# Patient Record
Sex: MALE | URBAN - METROPOLITAN AREA
[De-identification: names, ages, dates, MRNs, and addresses within clinical notes are randomized per-mention and may not be internally consistent; named-entity substitution may affect disease eponyms.]

---
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
## 2025-02-10 ENCOUNTER — APPOINTMENT (OUTPATIENT)
Dept: SURGICAL ONCOLOGY | Facility: MEDICAL CENTER | Age: 20
End: 2025-02-10
Payer: COMMERCIAL

## 2025-03-10 ENCOUNTER — APPOINTMENT (OUTPATIENT)
Facility: MEDICAL CENTER | Age: 20
End: 2025-03-10
Payer: COMMERCIAL

## 2025-03-10 VITALS
TEMPERATURE: 97.6 F | DIASTOLIC BLOOD PRESSURE: 60 MMHG | OXYGEN SATURATION: 97 % | SYSTOLIC BLOOD PRESSURE: 118 MMHG | WEIGHT: 193.78 LBS | HEART RATE: 85 BPM | HEIGHT: 68 IN | BODY MASS INDEX: 29.37 KG/M2

## 2025-03-10 DIAGNOSIS — L02.91 ABSCESS: ICD-10-CM

## 2025-03-10 PROCEDURE — 3074F SYST BP LT 130 MM HG: CPT | Performed by: SURGERY

## 2025-03-10 PROCEDURE — 99213 OFFICE O/P EST LOW 20 MIN: CPT | Performed by: SURGERY

## 2025-03-10 PROCEDURE — 3078F DIAST BP <80 MM HG: CPT | Performed by: SURGERY

## 2025-03-10 ASSESSMENT — FIBROSIS 4 INDEX: FIB4 SCORE: 0.37

## 2025-03-10 NOTE — PROGRESS NOTES
Subjective:   3/10/2025  1:42 PM  Primary care physician:GILMAR Rome        Chief Complaint:   Chief Complaint   Patient presents with    Follow-Up     perianal abscess, pt states symptoms returned multiple times       12/16: History of presenting illness:  Beck Jha  is a pleasant 19 y.o. year old male who presented with a recent perianal abscess which was found on CT in the ER and drained at beside.  He since has noted resolution of a lot of the pain and discomfort but did not a recurrent swelling and drainage about 1-2 weeks after the original procedure.  Since then though, he has not had any further output or drainage and reports no lasting symptoms.  He is having bowel movements and passing stools without difficulty at this time.  He denies any prior anorectal surgeries and does not take any blood thinners or steroids at this time.      3/10:  Pt returns for follow up, recently had a new abscess on the left buttock, he was seen in the ED and had a drainage procedure and this resolved.  This was not in the same location as previous abscesses, and he has no residual symptoms at this time.  He had wanted to have the area looked at though in case it was related to the perianal issue from a couple of months ago.          Past Medical History:   Diagnosis Date    ASTHMA     Eczema     Family disruption by separation or divorce 9/1/2011    Food allergy 9/1/2011     Past Surgical History:   Procedure Laterality Date    APPENDECTOMY LAPAROSCOPIC  11/06/12    APPENDECTOMY LAPAROSCOPIC  11/6/2012    Performed by Nelson Houston M.D. at SURGERY [facility]     No Known Allergies  Current Outpatient Medications   Medication Sig    metformin ER modified (GLUMETZA) 1000 MG TABLET SR 24 HR Take 1 Tablet by mouth every day. (Patient taking differently: Take 0.5 Tablets by mouth every day.)    Blood Glucose Monitoring Suppl (FREESTYLE FREEDOM LITE) w/Device Kit Please provide glucose monitoring kit  per insurance.    glucose blood strip use to test blood sugar 3 times a day    Lancets 30G Misc use to test blood sugar 3 times a day     Social History     Socioeconomic History    Marital status: Single     Spouse name: Not on file    Number of children: Not on file    Years of education: Not on file    Highest education level: Not on file   Occupational History    Not on file   Tobacco Use    Smoking status: Never    Smokeless tobacco: Never   Substance and Sexual Activity    Alcohol use: Never    Drug use: Never    Sexual activity: Never   Other Topics Concern    Behavioral problems Not Asked    Interpersonal relationships Not Asked    Sad or not enjoying activities Not Asked    Suicidal thoughts Not Asked    Poor school performance Not Asked    Reading difficulties Not Asked    Speech difficulties Not Asked    Writing difficulties Not Asked    Inadequate sleep Not Asked    Excessive TV viewing Not Asked    Excessive video game use Not Asked    Inadequate exercise Not Asked    Sports related Not Asked    Poor diet Not Asked    Family concerns for drug/alcohol abuse Not Asked    Poor oral hygiene Not Asked    Bike safety Not Asked    Family concerns vehicle safety Not Asked   Social History Narrative    Not on file     Social Drivers of Health     Financial Resource Strain: Not on file   Food Insecurity: Not on file   Transportation Needs: Not on file   Physical Activity: Not on file   Stress: Not on file   Social Connections: Not on file   Intimate Partner Violence: Not on file   Housing Stability: Not on file      Family History   Problem Relation Age of Onset    Hypertension Father     Diabetes Maternal Grandmother     Diabetes Maternal Grandfather     Heart Disease Paternal Grandmother     Hypertension Paternal Grandmother     Heart Attack Paternal Grandfather     Heart Disease Paternal Grandfather     Hypertension Paternal Grandfather        ROS: Negative except as detailed in HPI.     Objective:   /60  "(BP Location: Left arm, Patient Position: Sitting, BP Cuff Size: Adult)   Pulse 85   Temp 36.4 °C (97.6 °F) (Temporal)   Ht 1.727 m (5' 8\")   Wt 87.9 kg (193 lb 12.6 oz)   SpO2 97%   BMI 29.46 kg/m²     Physical Exam:  Constitutional: Well-developed and well-nourished. Not diaphoretic. No distress.   Skin: Skin is warm and dry. No rash noted.  Head: Atraumatic without lesions.  Eyes: Conjunctivae and extraocular motions are normal. No scleral icterus.   Nose: Nares patent. Septum midline. No discharge.   Neck: Supple, trachea midline. Normal range of motion. No thyromegaly present. No lymphadenopathy--cervical or supraclavicular.  Cardiovascular: Regular rate and rhythm, 2+ pulses   Lungs: Effort normal. No chest wall masses.   Abdomen: Soft, non tender, and without distention. No rebound, guarding, masses.  Rectal: No masses or lesions on external exam, no evidence of residual abscess or infection noted/    Extremities: Warm and well perfused, no pitting edema  Musculoskeletal: All major joints AROM full in all directions without pain.  Neurological: Alert and oriented x 3.   Psychiatric:  Behavior, mood, and affect are appropriate.          Diagnosis:     Assessment & Plan  Abscess               Medical Decision Making:  Today's Assessment / Status / Plan:     19y M with resolved perianal and buttock abscess.  Follow up again prn if symptoms recur or return, in the meantime, recommend a high fiber diet and activity as tolerated.        "

## 2025-05-15 ENCOUNTER — OFFICE VISIT (OUTPATIENT)
Dept: URGENT CARE | Facility: PHYSICIAN GROUP | Age: 20
End: 2025-05-15
Payer: COMMERCIAL

## 2025-05-15 ENCOUNTER — APPOINTMENT (OUTPATIENT)
Dept: RADIOLOGY | Facility: MEDICAL CENTER | Age: 20
End: 2025-05-15
Payer: COMMERCIAL

## 2025-05-15 ENCOUNTER — APPOINTMENT (OUTPATIENT)
Dept: RADIOLOGY | Facility: MEDICAL CENTER | Age: 20
End: 2025-05-15
Attending: EMERGENCY MEDICINE
Payer: COMMERCIAL

## 2025-05-15 ENCOUNTER — HOSPITAL ENCOUNTER (EMERGENCY)
Facility: MEDICAL CENTER | Age: 20
End: 2025-05-16
Attending: EMERGENCY MEDICINE
Payer: COMMERCIAL

## 2025-05-15 VITALS
SYSTOLIC BLOOD PRESSURE: 126 MMHG | HEART RATE: 87 BPM | DIASTOLIC BLOOD PRESSURE: 78 MMHG | BODY MASS INDEX: 28.79 KG/M2 | TEMPERATURE: 99 F | RESPIRATION RATE: 16 BRPM | WEIGHT: 190 LBS | OXYGEN SATURATION: 98 % | HEIGHT: 68 IN

## 2025-05-15 DIAGNOSIS — L02.11 ABSCESS OF NECK: Primary | ICD-10-CM

## 2025-05-15 DIAGNOSIS — K61.0 PERIANAL ABSCESS: ICD-10-CM

## 2025-05-15 DIAGNOSIS — L02.11 ABSCESS OF NECK: ICD-10-CM

## 2025-05-15 DIAGNOSIS — K61.0 PERIANAL ABSCESS: Primary | ICD-10-CM

## 2025-05-15 LAB
ALBUMIN SERPL BCP-MCNC: 4 G/DL (ref 3.2–4.9)
ALBUMIN/GLOB SERPL: 1.1 G/DL
ALP SERPL-CCNC: 151 U/L (ref 30–99)
ALT SERPL-CCNC: 43 U/L (ref 2–50)
ANION GAP SERPL CALC-SCNC: 11 MMOL/L (ref 7–16)
AST SERPL-CCNC: 35 U/L (ref 12–45)
BASOPHILS # BLD AUTO: 0.4 % (ref 0–1.8)
BASOPHILS # BLD: 0.05 K/UL (ref 0–0.12)
BILIRUB SERPL-MCNC: 1.4 MG/DL (ref 0.1–1.5)
BUN SERPL-MCNC: 9 MG/DL (ref 8–22)
CALCIUM ALBUM COR SERPL-MCNC: 9.8 MG/DL (ref 8.5–10.5)
CALCIUM SERPL-MCNC: 9.8 MG/DL (ref 8.5–10.5)
CHLORIDE SERPL-SCNC: 97 MMOL/L (ref 96–112)
CO2 SERPL-SCNC: 22 MMOL/L (ref 20–33)
CREAT SERPL-MCNC: 0.82 MG/DL (ref 0.5–1.4)
EOSINOPHIL # BLD AUTO: 0.04 K/UL (ref 0–0.51)
EOSINOPHIL NFR BLD: 0.3 % (ref 0–6.9)
ERYTHROCYTE [DISTWIDTH] IN BLOOD BY AUTOMATED COUNT: 37.7 FL (ref 35.9–50)
GFR SERPLBLD CREATININE-BSD FMLA CKD-EPI: 129 ML/MIN/1.73 M 2
GLOBULIN SER CALC-MCNC: 3.6 G/DL (ref 1.9–3.5)
GLUCOSE SERPL-MCNC: 326 MG/DL (ref 65–99)
HCT VFR BLD AUTO: 49.1 % (ref 42–52)
HGB BLD-MCNC: 17 G/DL (ref 14–18)
IMM GRANULOCYTES # BLD AUTO: 0.06 K/UL (ref 0–0.11)
IMM GRANULOCYTES NFR BLD AUTO: 0.5 % (ref 0–0.9)
LACTATE SERPL-SCNC: 1.3 MMOL/L (ref 0.5–2)
LYMPHOCYTES # BLD AUTO: 1.81 K/UL (ref 1–4.8)
LYMPHOCYTES NFR BLD: 14.3 % (ref 22–41)
MCH RBC QN AUTO: 29.1 PG (ref 27–33)
MCHC RBC AUTO-ENTMCNC: 34.6 G/DL (ref 32.3–36.5)
MCV RBC AUTO: 83.9 FL (ref 81.4–97.8)
MONOCYTES # BLD AUTO: 1.06 K/UL (ref 0–0.85)
MONOCYTES NFR BLD AUTO: 8.4 % (ref 0–13.4)
NEUTROPHILS # BLD AUTO: 9.67 K/UL (ref 1.82–7.42)
NEUTROPHILS NFR BLD: 76.1 % (ref 44–72)
NRBC # BLD AUTO: 0 K/UL
NRBC BLD-RTO: 0 /100 WBC (ref 0–0.2)
PLATELET # BLD AUTO: 330 K/UL (ref 164–446)
PMV BLD AUTO: 9.1 FL (ref 9–12.9)
POTASSIUM SERPL-SCNC: 3.9 MMOL/L (ref 3.6–5.5)
PROT SERPL-MCNC: 7.6 G/DL (ref 6–8.2)
RBC # BLD AUTO: 5.85 M/UL (ref 4.7–6.1)
SODIUM SERPL-SCNC: 130 MMOL/L (ref 135–145)
WBC # BLD AUTO: 12.7 K/UL (ref 4.8–10.8)

## 2025-05-15 PROCEDURE — 700111 HCHG RX REV CODE 636 W/ 250 OVERRIDE (IP): Mod: JZ | Performed by: EMERGENCY MEDICINE

## 2025-05-15 PROCEDURE — 99204 OFFICE O/P NEW MOD 45 MIN: CPT

## 2025-05-15 PROCEDURE — 80053 COMPREHEN METABOLIC PANEL: CPT

## 2025-05-15 PROCEDURE — 87040 BLOOD CULTURE FOR BACTERIA: CPT

## 2025-05-15 PROCEDURE — 83605 ASSAY OF LACTIC ACID: CPT

## 2025-05-15 PROCEDURE — 85025 COMPLETE CBC W/AUTO DIFF WBC: CPT

## 2025-05-15 PROCEDURE — 3074F SYST BP LT 130 MM HG: CPT

## 2025-05-15 PROCEDURE — 36415 COLL VENOUS BLD VENIPUNCTURE: CPT

## 2025-05-15 PROCEDURE — 303977 HCHG I & D

## 2025-05-15 PROCEDURE — 3078F DIAST BP <80 MM HG: CPT

## 2025-05-15 PROCEDURE — 83036 HEMOGLOBIN GLYCOSYLATED A1C: CPT

## 2025-05-15 PROCEDURE — 99284 EMERGENCY DEPT VISIT MOD MDM: CPT

## 2025-05-15 RX ORDER — SODIUM CHLORIDE, SODIUM LACTATE, POTASSIUM CHLORIDE, AND CALCIUM CHLORIDE .6; .31; .03; .02 G/100ML; G/100ML; G/100ML; G/100ML
30 INJECTION, SOLUTION INTRAVENOUS ONCE
Status: COMPLETED | OUTPATIENT
Start: 2025-05-15 | End: 2025-05-16

## 2025-05-15 RX ORDER — ONDANSETRON 2 MG/ML
4 INJECTION INTRAMUSCULAR; INTRAVENOUS ONCE
Status: COMPLETED | OUTPATIENT
Start: 2025-05-15 | End: 2025-05-16

## 2025-05-15 RX ADMIN — LIDOCAINE HYDROCHLORIDE 10 ML: 10 INJECTION, SOLUTION EPIDURAL; INFILTRATION; INTRACAUDAL; PERINEURAL at 23:45

## 2025-05-15 ASSESSMENT — PAIN DESCRIPTION - PAIN TYPE: TYPE: ACUTE PAIN

## 2025-05-15 ASSESSMENT — FIBROSIS 4 INDEX
FIB4 SCORE: 0.37
FIB4 SCORE: 0.37

## 2025-05-16 ENCOUNTER — HOSPITAL ENCOUNTER (OUTPATIENT)
Dept: RADIOLOGY | Facility: MEDICAL CENTER | Age: 20
End: 2025-05-16
Attending: EMERGENCY MEDICINE
Payer: COMMERCIAL

## 2025-05-16 VITALS
HEIGHT: 68 IN | HEART RATE: 105 BPM | SYSTOLIC BLOOD PRESSURE: 137 MMHG | DIASTOLIC BLOOD PRESSURE: 82 MMHG | TEMPERATURE: 96.9 F | BODY MASS INDEX: 28.79 KG/M2 | OXYGEN SATURATION: 94 % | RESPIRATION RATE: 36 BRPM | WEIGHT: 190 LBS

## 2025-05-16 LAB
EST. AVERAGE GLUCOSE BLD GHB EST-MCNC: 315 MG/DL
HBA1C MFR BLD: 12.6 % (ref 4–5.6)
LACTATE SERPL-SCNC: 1.5 MMOL/L (ref 0.5–2)
SCCMEC + MECA PNL NOSE NAA+PROBE: POSITIVE

## 2025-05-16 PROCEDURE — 700111 HCHG RX REV CODE 636 W/ 250 OVERRIDE (IP): Mod: JZ | Performed by: EMERGENCY MEDICINE

## 2025-05-16 PROCEDURE — 96366 THER/PROPH/DIAG IV INF ADDON: CPT

## 2025-05-16 PROCEDURE — 87641 MR-STAPH DNA AMP PROBE: CPT

## 2025-05-16 PROCEDURE — 74177 CT ABD & PELVIS W/CONTRAST: CPT

## 2025-05-16 PROCEDURE — 700105 HCHG RX REV CODE 258: Performed by: EMERGENCY MEDICINE

## 2025-05-16 PROCEDURE — 96365 THER/PROPH/DIAG IV INF INIT: CPT

## 2025-05-16 PROCEDURE — 700117 HCHG RX CONTRAST REV CODE 255: Performed by: EMERGENCY MEDICINE

## 2025-05-16 PROCEDURE — 96375 TX/PRO/DX INJ NEW DRUG ADDON: CPT

## 2025-05-16 PROCEDURE — 83605 ASSAY OF LACTIC ACID: CPT

## 2025-05-16 RX ORDER — CEPHALEXIN 500 MG/1
500 CAPSULE ORAL 4 TIMES DAILY
Qty: 28 CAPSULE | Refills: 0 | Status: ACTIVE | OUTPATIENT
Start: 2025-05-16 | End: 2025-05-23

## 2025-05-16 RX ORDER — SULFAMETHOXAZOLE AND TRIMETHOPRIM 800; 160 MG/1; MG/1
1 TABLET ORAL 2 TIMES DAILY
Qty: 14 TABLET | Refills: 0 | Status: ACTIVE | OUTPATIENT
Start: 2025-05-16 | End: 2025-05-23

## 2025-05-16 RX ADMIN — ONDANSETRON 4 MG: 2 INJECTION INTRAMUSCULAR; INTRAVENOUS at 00:08

## 2025-05-16 RX ADMIN — VANCOMYCIN HYDROCHLORIDE 2250 MG: 5 INJECTION, POWDER, LYOPHILIZED, FOR SOLUTION INTRAVENOUS at 01:53

## 2025-05-16 RX ADMIN — IOHEXOL 100 ML: 350 INJECTION, SOLUTION INTRAVENOUS at 00:28

## 2025-05-16 RX ADMIN — SODIUM CHLORIDE, POTASSIUM CHLORIDE, SODIUM LACTATE AND CALCIUM CHLORIDE 2586 ML: 600; 310; 30; 20 INJECTION, SOLUTION INTRAVENOUS at 01:52

## 2025-05-16 RX ADMIN — FENTANYL CITRATE 50 MCG: 50 INJECTION, SOLUTION INTRAMUSCULAR; INTRAVENOUS at 00:10

## 2025-05-16 ASSESSMENT — PAIN DESCRIPTION - PAIN TYPE
TYPE: ACUTE PAIN
TYPE: ACUTE PAIN

## 2025-05-16 NOTE — ED PROVIDER NOTES
ED Provider Note    CHIEF COMPLAINT  Chief Complaint   Patient presents with    Sent from Urgent Care    Wound Check     Pt seen at  for perianal abscess and abscess to right side of his neck.        EXTERNAL RECORDS REVIEWED  Outpatient Notes urgent care note from earlier today reviewed.  Prior surgical visit 3/10/2025    HPI/ROS  LIMITATION TO HISTORY   Select: : None  OUTSIDE HISTORIAN(S):  Family aunt at bedside    Beck Jha is a 19 y.o. male who presents to the emergency department for multiple abscesses.  Patient is a diabetic.  States that he has history of recurrent perianal abscess.  Previous incision and drainage in the ER.  Did have outpatient surgical consult with Dr. Fitzgerald, however he was asymptomatic and had resolution at the time of that visit.  Now over the last week has had increasing pain near the anus similar to prior abscess.  Also notes newer abscess to the right nape of his neck.  Not currently on antibiotics.  Went to urgent care and ultimately sent here for further care and workup.    PAST MEDICAL HISTORY   has a past medical history of ASTHMA, Eczema, Family disruption by separation or divorce (9/1/2011), and Food allergy (9/1/2011).    SURGICAL HISTORY   has a past surgical history that includes appendectomy laparoscopic (11/06/12) and appendectomy laparoscopic (11/6/2012).    FAMILY HISTORY  Family History   Problem Relation Age of Onset    Hypertension Father     Diabetes Maternal Grandmother     Diabetes Maternal Grandfather     Heart Disease Paternal Grandmother     Hypertension Paternal Grandmother     Heart Attack Paternal Grandfather     Heart Disease Paternal Grandfather     Hypertension Paternal Grandfather        SOCIAL HISTORY  Social History     Tobacco Use    Smoking status: Never    Smokeless tobacco: Never   Vaping Use    Vaping status: Never Used   Substance and Sexual Activity    Alcohol use: Never    Drug use: Never    Sexual activity: Never       CURRENT  "MEDICATIONS  Home Medications       Reviewed by Bhavya Proctor R.N. (Registered Nurse) on 05/15/25 at 2134  Med List Status: Partial     Medication Last Dose Status   Blood Glucose Monitoring Suppl (FREESTYLE FREEDOM LITE) w/Device Kit  Active   glucose blood strip  Active   Lancets 30G Misc  Active   metformin ER modified (GLUMETZA) 1000 MG TABLET SR 24 HR  Active                    ALLERGIES  Allergies[1]    PHYSICAL EXAM  VITAL SIGNS: /79   Pulse 100   Temp 36.1 °C (96.9 °F) (Temporal)   Resp 18   Ht 1.727 m (5' 8\")   Wt 86.2 kg (190 lb)   SpO2 94%   BMI 28.89 kg/m²      Pulse ox interpretation: I interpret this pulse ox as normal.  Constitutional: Alert in no apparent distress.  HENT: No signs of trauma, Bilateral external ears normal, Nose normal.   Eyes: Pupils are equal and reactive  Neck: Large indurated draining abscess to the right nape of neck of roughly 3 x 4 cm size.  Tender to palpation.  Cardiovascular: Regular rate and rhythm, no murmurs.   Thorax & Lungs: Normal breath sounds, No respiratory distress  Abdomen: Bowel sounds normal, Soft, No tenderness  : 3:00 large abscess which is fluctuant with apex near the anus.  Skin: Warm, Dry, No erythema, No rash.   Musculoskeletal: Good range of motion in all major joints. No tenderness to palpation or major deformities noted.   Neurologic: Alert , Normal motor function, Normal sensory function, No focal deficits noted.   Psychiatric: Affect normal, Judgment normal, Mood normal.         EKG/LABS  Results for orders placed or performed during the hospital encounter of 05/15/25   Lactic Acid    Collection Time: 05/15/25  9:51 PM   Result Value Ref Range    Lactic Acid 1.3 0.5 - 2.0 mmol/L   CBC with Differential    Collection Time: 05/15/25  9:51 PM   Result Value Ref Range    WBC 12.7 (H) 4.8 - 10.8 K/uL    RBC 5.85 4.70 - 6.10 M/uL    Hemoglobin 17.0 14.0 - 18.0 g/dL    Hematocrit 49.1 42.0 - 52.0 %    MCV 83.9 81.4 - 97.8 fL    MCH 29.1 27.0 " - 33.0 pg    MCHC 34.6 32.3 - 36.5 g/dL    RDW 37.7 35.9 - 50.0 fL    Platelet Count 330 164 - 446 K/uL    MPV 9.1 9.0 - 12.9 fL    Neutrophils-Polys 76.10 (H) 44.00 - 72.00 %    Lymphocytes 14.30 (L) 22.00 - 41.00 %    Monocytes 8.40 0.00 - 13.40 %    Eosinophils 0.30 0.00 - 6.90 %    Basophils 0.40 0.00 - 1.80 %    Immature Granulocytes 0.50 0.00 - 0.90 %    Nucleated RBC 0.00 0.00 - 0.20 /100 WBC    Neutrophils (Absolute) 9.67 (H) 1.82 - 7.42 K/uL    Lymphs (Absolute) 1.81 1.00 - 4.80 K/uL    Monos (Absolute) 1.06 (H) 0.00 - 0.85 K/uL    Eos (Absolute) 0.04 0.00 - 0.51 K/uL    Baso (Absolute) 0.05 0.00 - 0.12 K/uL    Immature Granulocytes (abs) 0.06 0.00 - 0.11 K/uL    NRBC (Absolute) 0.00 K/uL   Complete Metabolic Panel    Collection Time: 05/15/25  9:51 PM   Result Value Ref Range    Sodium 130 (L) 135 - 145 mmol/L    Potassium 3.9 3.6 - 5.5 mmol/L    Chloride 97 96 - 112 mmol/L    Co2 22 20 - 33 mmol/L    Anion Gap 11.0 7.0 - 16.0    Glucose 326 (H) 65 - 99 mg/dL    Bun 9 8 - 22 mg/dL    Creatinine 0.82 0.50 - 1.40 mg/dL    Calcium 9.8 8.5 - 10.5 mg/dL    Correct Calcium 9.8 8.5 - 10.5 mg/dL    AST(SGOT) 35 12 - 45 U/L    ALT(SGPT) 43 2 - 50 U/L    Alkaline Phosphatase 151 (H) 30 - 99 U/L    Total Bilirubin 1.4 0.1 - 1.5 mg/dL    Albumin 4.0 3.2 - 4.9 g/dL    Total Protein 7.6 6.0 - 8.2 g/dL    Globulin 3.6 (H) 1.9 - 3.5 g/dL    A-G Ratio 1.1 g/dL   Blood Culture - Draw one from central line and one from peripheral site    Collection Time: 05/15/25  9:51 PM    Specimen: Peripheral; Blood   Result Value Ref Range    Significant Indicator NEG     Source BLD     Site PERIPHERAL     Culture Result       No Growth  Note: Blood cultures are incubated for 5 days and  are monitored continuously.Positive blood cultures  are called to the RN and reported as soon as  they are identified.     ESTIMATED GFR    Collection Time: 05/15/25  9:51 PM   Result Value Ref Range    GFR (CKD-EPI) 129 >60 mL/min/1.73 m 2   Lactic Acid     Collection Time: 05/16/25 12:21 AM   Result Value Ref Range    Lactic Acid 1.5 0.5 - 2.0 mmol/L           RADIOLOGY/PROCEDURES   I have independently interpreted the diagnostic imaging associated with this visit and am waiting the final reading from the radiologist.   My preliminary interpretation is as follows: Perianal abscess noted    Radiologist interpretation:  CT-ABDOMEN-PELVIS WITH   Final Result      2.5 x 2.1 x 2.8 cm right perianal abscess.           Incision and drainage: 2 cc of 1% lidocaine infiltrated into perianal abscess at 3:00 of anus.  #11 blade scalpel then utilized to make a stab incision of 1 cm.  Immediate pus released.  Roughly 8 cc removed.  Forceps used for deloculation.  This was stopped short of complete deloculation secondary to discomfort.  Overall patient Toller well.    COURSE & MEDICAL DECISION MAKING    ASSESSMENT, COURSE AND PLAN  Care Narrative: Patient presented to the [facility] primary with 2 abscesses 1 to the neck and one to the buttock.  Given reoccurrence of perianal abscess will complete CT imaging to evaluate for possible further extension.  Will complete labs and IV antibiotics as well    DISPOSITION AND DISCUSSIONS  I have discussed management of the patient with the following physicians and JIMMIE's: None    Discussion of management with other HP or appropriate source(s): Pharmacy for medication verification     19-year-old male presented to the emergency department with above presentation.  Labs as above.  IV antibiotics given.  Procedure note for I&D of perianal abscess complete.  I&D of the neck wound not completed secondary to induration and lack of fluctuation or identifiable fluid pocket.  Patient will be continued on home oral antibiotics.  He has a follow-up appointment with general surgery within the next 2 weeks.  Also can make outpatient appointment with PCP for reevaluation there.  Will return to the ER with any acute changes or worsening    FINAL  DIAGNOSIS  1. Perianal abscess    2. Abscess of neck         Electronically signed by: Domingo Olivarez M.D., 5/15/2025 11:27 PM           [1] No Known Allergies

## 2025-05-16 NOTE — ED NOTES
Patient to CT with tech    Blood specimen drawn and sent tolab.  Patient medicated per MAR  Lidocaine at bedside.

## 2025-05-16 NOTE — ED TRIAGE NOTES
Chief Complaint   Patient presents with    Sent from Urgent Care    Wound Check     Pt seen at  for perianal abscess and abscess to right side of his neck.     Wheeled to triage for above complaint with Aunt. Pt seen at  and sent for further evaluation. Pt states he has a history of reoccurring perianal abscesses. States his perianal abscess appeared approx 4 days prior, reports constant pain, denies any drainage from perianal abscess. Pt states that he had an abscess appear to the right side of his neck approx 4 days prior. States that the abscess has been hot to touch, swollen, and draining. States he is unable to move his neck and has had a headache due to the pain for 3 days. Denies nay visual changes. Denies fevers, chills, chest pain, or SOB. States he has an appointment with Gen Surg on 6/4 for his perianal abscess. Denies any current abx.     Pt placed in lobby and educated on triage process. Pt encouraged to alert staff for any changes.    Protocol ordered.     Patient and staff wearing appropriate PPE.

## 2025-05-16 NOTE — PROGRESS NOTES
"Subjective:   Beck Jha is a 19 y.o. male who presents for Abscess (Recurring abscess, on his behind and on the right side of neck, severe head aches, draining, very painful, sharp pain, started growing more 2 days ago, )    Patient presents to the clinic for complaints of open abscess in the right posterior neck and in the right perianal area over the last 4 days.  Right neck abscess started as a small black spot that progressively grew, became swollen, and started draining thick purulent drainage.  Perianal abscess has been progressively becoming tender and more swollen over the last days as well.  Pain regarding both abscesses has become severe and unbearable to the patient.  History of recurrent abscesses.    Has an upcoming appointment with general surgery regarding his abscesses in June.  Has taken Tylenol and ibuprofen, which has not helped the pain.  Patient denies chest pain, SOB, dizziness/lightheadedness/vertigo, nausea/vomiting/diarrhea, difficulty breathing or swallowing, palpitations or racing heart rate, fever, or chills.      HPI    ROS  Refer to HPI for additional details.    During this visit, appropriate PPE was worn, and hand hygiene was performed.    PMH:  has a past medical history of ASTHMA, Eczema, Family disruption by separation or divorce (9/1/2011), and Food allergy (9/1/2011).    He has no past medical history of Allergy, Clotting disorder (HCC), Migraine, Tuberculosis, or Urinary tract infection, site not specified.    MEDS: Current Medications[1]    ALLERGIES: Allergies[2]  SURGHX: Past Surgical History[3]  SOCHX:  reports that he has never smoked. He has never used smokeless tobacco. He reports that he does not drink alcohol and does not use drugs.    FH: Per HPI as applicable/pertinent.    Medications, Allergies, and current problem list reviewed today in Epic.     Objective:     /78   Pulse 87   Temp 37.2 °C (99 °F) (Temporal)   Resp 16   Ht 1.727 m (5' 8\")   Wt " 86.2 kg (190 lb)   SpO2 98%     Physical Exam  Constitutional:       General: He is in acute distress (pain from abscesses).      Appearance: Normal appearance.   HENT:      Head:        Comments: Extremely tender and open abscess present to the right posterior neck area that is draining with purulent and serosanguineous drainage.  Photograph is present in chart media.     Right Ear: External ear normal.      Left Ear: External ear normal.   Eyes:      Extraocular Movements: Extraocular movements intact.      Conjunctiva/sclera: Conjunctivae normal.   Cardiovascular:      Rate and Rhythm: Normal rate and regular rhythm.      Pulses: Normal pulses.   Pulmonary:      Effort: Pulmonary effort is normal.   Abdominal:      General: Abdomen is flat.      Palpations: Abdomen is soft.   Genitourinary:         Comments: Visible pilonidal cyst to the right of the anus as highlighted in the image above that is erythematous, swollen, and tender.  No bleeding, drainage, or breaks in the skin/wounds.  Musculoskeletal:      Cervical back: No rigidity.   Lymphadenopathy:      Cervical: No cervical adenopathy.   Skin:     Capillary Refill: Capillary refill takes less than 2 seconds.   Neurological:      Mental Status: He is alert.      Gait: Gait abnormal (d/t pilonidal abscess pain).   Psychiatric:         Mood and Affect: Mood normal.         Behavior: Behavior normal.         Thought Content: Thought content normal.         Judgment: Judgment normal.         Assessment/Plan:     Diagnosis and associated orders:     There are no diagnoses linked to this encounter.   Comments/MDM:     Discussed that likely etiology of the patient's symptoms is pilonidal abscess as well as a right posterior neck abscess.  Discussed with patient that the the pilonidal cyst could be drained in the urgent care but the proximity of the right posterior neck abscess to the posterior cervical lymph nodes as well as the cervical spine would make it unsafe  to do a incision and drainage in the urgent care setting and would be more appropriate in the emergency department.  Instructed patient to go to the [facility] emergency department for higher level of care.  Patient is in agreement with this plan of care.  At this time, patient refusing EMS transport services as they feel like they can drive themselves to the emergency department safely.  Per patient, [facility] emergency department is what his insurance covers and would prefer to go there.  [facility] emergency department called by this provider and given report to one of the emergency department providers of the patient situation as well as his impending arrival via private vehicle to the same [facility] emergency department.  Patient agreeable to this plan of care.  All questions answered.  Return to clinic if symptoms persist or worsen.           Differential diagnosis, natural history, supportive care, and indications for immediate follow-up discussed.    Advised the patient to follow-up with the primary care physician for recheck, reevaluation, and consideration of further management.    Instructed patient to seek emergency medical attention via calling EMS or going to the Emergency Room for red flag symptoms, including but not limited to: chest pain, palpitations, fever greater than 103F, shortness of breath, wheezing, new or worsened numbness/tingling, focal or unilateral weakness, and bloody vomit/stool.     Please note that this dictation was created using voice recognition software. I have made a reasonable attempt to correct obvious errors, but I expect that there are errors of grammar and possibly content that I did not discover before finalizing the note.    This note was electronically signed by ESSIE Gallagher           [1]   Current Outpatient Medications:     Blood Glucose Monitoring Suppl (FREESTYLE FREEDOM LITE) w/Device Kit, Please provide glucose monitoring kit per insurance., Disp: 1  Kit, Rfl: 0    glucose blood strip, use to test blood sugar 3 times a day, Disp: 200 Strip, Rfl: 3    Lancets 30G Misc, use to test blood sugar 3 times a day, Disp: 100 Each, Rfl: 0    metformin ER modified (GLUMETZA) 1000 MG TABLET SR 24 HR, Take 1 Tablet by mouth every day. (Patient not taking: Reported on 5/15/2025), Disp: 30 Tablet, Rfl: 2  [2] No Known Allergies  [3]   Past Surgical History:  Procedure Laterality Date    APPENDECTOMY LAPAROSCOPIC  11/06/12    APPENDECTOMY LAPAROSCOPIC  11/6/2012    Performed by Nelson Houston M.D. at SURGERY [facility]

## 2025-05-20 LAB
BACTERIA BLD CULT: NORMAL
SIGNIFICANT IND 70042: NORMAL
SITE SITE: NORMAL
SOURCE SOURCE: NORMAL

## 2025-06-23 ENCOUNTER — OFFICE VISIT (OUTPATIENT)
Facility: MEDICAL CENTER | Age: 20
End: 2025-06-23
Payer: COMMERCIAL

## 2025-06-23 VITALS
TEMPERATURE: 97.5 F | HEART RATE: 71 BPM | BODY MASS INDEX: 29.74 KG/M2 | WEIGHT: 196.21 LBS | HEIGHT: 68 IN | OXYGEN SATURATION: 97 % | SYSTOLIC BLOOD PRESSURE: 122 MMHG | DIASTOLIC BLOOD PRESSURE: 68 MMHG

## 2025-06-23 DIAGNOSIS — K60.30 ANAL FISTULA: Primary | ICD-10-CM

## 2025-06-23 PROCEDURE — 3078F DIAST BP <80 MM HG: CPT | Performed by: SURGERY

## 2025-06-23 PROCEDURE — 3074F SYST BP LT 130 MM HG: CPT | Performed by: SURGERY

## 2025-06-23 PROCEDURE — 99214 OFFICE O/P EST MOD 30 MIN: CPT | Performed by: SURGERY

## 2025-06-23 ASSESSMENT — FIBROSIS 4 INDEX: FIB4 SCORE: 0.31

## 2025-06-23 NOTE — Clinical Note
Pt needs a pelvic MRI (looking for anal fistula) followed by anorectal EUA surgery with possible fistulotomy

## 2025-06-23 NOTE — PROGRESS NOTES
Subjective:   6/23/2025 10:42 AM  Primary care physician:GILMAR Rome        Chief Complaint:   Chief Complaint   Patient presents with    Follow-Up     f/u perianal abscess// continues to reoccur       12/16: History of presenting illness:  Beck Jha  is a pleasant 19 y.o. year old male who presented with a recent perianal abscess which was found on CT in the ER and drained at beside.  He since has noted resolution of a lot of the pain and discomfort but did not a recurrent swelling and drainage about 1-2 weeks after the original procedure.  Since then though, he has not had any further output or drainage and reports no lasting symptoms.  He is having bowel movements and passing stools without difficulty at this time.  He denies any prior anorectal surgeries and does not take any blood thinners or steroids at this time.      3/10:  Pt returns for follow up, recently had a new abscess on the right buttock, he was seen in the ED and had a drainage procedure and this resolved.  This was not in the same location as previous abscesses, and he has no residual symptoms at this time.  He had wanted to have the area looked at though in case it was related to the perianal issue from a couple of months ago.      6/23:  Pt returns for follow up and again had an abscess which he was seen for and treated with abx. Again, this issue has resolved but he is very worried about this continuing to return.    He denies any current fevers or chills or other symptoms.        Past Medical History:   Diagnosis Date    ASTHMA     Eczema     Family disruption by separation or divorce 9/1/2011    Food allergy 9/1/2011     Past Surgical History:   Procedure Laterality Date    APPENDECTOMY LAPAROSCOPIC  11/06/12    APPENDECTOMY LAPAROSCOPIC  11/6/2012    Performed by Nelson Houston M.D. at SUR[facility]     No Known Allergies  No current outpatient medications on file.     Social History     Socioeconomic  History    Marital status: Single     Spouse name: Not on file    Number of children: Not on file    Years of education: Not on file    Highest education level: Not on file   Occupational History    Not on file   Tobacco Use    Smoking status: Never    Smokeless tobacco: Never   Vaping Use    Vaping status: Never Used   Substance and Sexual Activity    Alcohol use: Never    Drug use: Never    Sexual activity: Never   Other Topics Concern    Behavioral problems Not Asked    Interpersonal relationships Not Asked    Sad or not enjoying activities Not Asked    Suicidal thoughts Not Asked    Poor school performance Not Asked    Reading difficulties Not Asked    Speech difficulties Not Asked    Writing difficulties Not Asked    Inadequate sleep Not Asked    Excessive TV viewing Not Asked    Excessive video game use Not Asked    Inadequate exercise Not Asked    Sports related Not Asked    Poor diet Not Asked    Family concerns for drug/alcohol abuse Not Asked    Poor oral hygiene Not Asked    Bike safety Not Asked    Family concerns vehicle safety Not Asked   Social History Narrative    Not on file     Social Drivers of Health     Financial Resource Strain: Not on file   Food Insecurity: Not on file   Transportation Needs: Not on file   Physical Activity: Not on file   Stress: Not on file   Social Connections: Not on file   Intimate Partner Violence: Not on file   Housing Stability: Not on file      Family History   Problem Relation Age of Onset    Hypertension Father     Diabetes Maternal Grandmother     Diabetes Maternal Grandfather     Heart Disease Paternal Grandmother     Hypertension Paternal Grandmother     Heart Attack Paternal Grandfather     Heart Disease Paternal Grandfather     Hypertension Paternal Grandfather        ROS: Negative except as detailed in HPI.     Objective:   /68 (BP Location: Left arm, Patient Position: Sitting, BP Cuff Size: Adult)   Pulse 71   Temp 36.4 °C (97.5 °F) (Temporal)   Ht  "1.727 m (5' 8\")   Wt 89 kg (196 lb 3.4 oz)   SpO2 97%   BMI 29.83 kg/m²     Physical Exam:  Constitutional: Well-developed and well-nourished. Not diaphoretic. No distress.   Skin: Skin is warm and dry. No rash noted.  Head: Atraumatic without lesions.  Eyes: Conjunctivae and extraocular motions are normal. No scleral icterus.   Nose: Nares patent. Septum midline. No discharge.   Neck: Supple, trachea midline. Normal range of motion. No thyromegaly present. No lymphadenopathy--cervical or supraclavicular.  Cardiovascular: Regular rate and rhythm, 2+ pulses   Lungs: Effort normal. No chest wall masses.   Abdomen: Soft, non tender, and without distention. No rebound, guarding, masses.  Rectal: No masses or lesions on external exam, no evidence of residual abscess or infection noted.    Extremities: Warm and well perfused, no pitting edema  Musculoskeletal: All major joints AROM full in all directions without pain.  Neurological: Alert and oriented x 3.   Psychiatric:  Behavior, mood, and affect are appropriate.          Diagnosis:     Assessment & Plan  Anal fistula               Medical Decision Making:  Today's Assessment / Status / Plan:     19y M with resolved perianal and buttock abscess.  Ongoing recurrences of this issue, suspect there may be some underlying fistula present.  I would like to get an MRI next and then proceed with an anorectal EUA to definitively rule out an anal fistula.  If a fistula is present, we discussed possible fistulotomy at the time of surgery as well.  Risks/benefits/alternatives of planned surgery were discussed with the patient.  They understand issues involved and agree with the surgical treatment plan.  Will arrange for surgery at the next available time.      MRI pelvis  AnoRectal EUA with possible fistulotomy  Prone      "

## 2025-06-23 NOTE — PATIENT INSTRUCTIONS
No Eating after midnight the night before surgery.  After surgery, you will be discharged home.  You will need a ride home from the hospital.  You may eat and drink a normal diet after surgery.  High fiber diet is encouraged along with a daily fiber supplement.    It is ok to go for walks and go up and down stairs.    Some bleeding with bowel movements and discharge/drainage after surgery is normal and part of the healing process.      Ok to shower with wounds uncovered after surgery.  Warm baths are encouraged after surgery to help with pain, epsom salt is optional.   Expect 2 weeks off work after fistula surgery due to pain, bleeding, discomfort which is part of recovery.

## 2025-08-15 ENCOUNTER — TELEPHONE (OUTPATIENT)
Facility: MEDICAL CENTER | Age: 20
End: 2025-08-15
Payer: COMMERCIAL